# Patient Record
Sex: MALE | Race: WHITE | NOT HISPANIC OR LATINO | URBAN - METROPOLITAN AREA
[De-identification: names, ages, dates, MRNs, and addresses within clinical notes are randomized per-mention and may not be internally consistent; named-entity substitution may affect disease eponyms.]

---

## 2018-08-21 VITALS
HEART RATE: 104 BPM | RESPIRATION RATE: 18 BRPM | SYSTOLIC BLOOD PRESSURE: 144 MMHG | TEMPERATURE: 98 F | OXYGEN SATURATION: 96 % | WEIGHT: 176.15 LBS | DIASTOLIC BLOOD PRESSURE: 85 MMHG

## 2018-08-21 LAB
ALBUMIN SERPL ELPH-MCNC: 3.9 G/DL — SIGNIFICANT CHANGE UP (ref 3.3–5)
ALP SERPL-CCNC: 50 U/L — SIGNIFICANT CHANGE UP (ref 40–120)
ALT FLD-CCNC: 23 U/L — SIGNIFICANT CHANGE UP (ref 10–45)
ANION GAP SERPL CALC-SCNC: 12 MMOL/L — SIGNIFICANT CHANGE UP (ref 5–17)
APTT BLD: 31.8 SEC — SIGNIFICANT CHANGE UP (ref 27.5–37.4)
AST SERPL-CCNC: 26 U/L — SIGNIFICANT CHANGE UP (ref 10–40)
BASOPHILS NFR BLD AUTO: 0.7 % — SIGNIFICANT CHANGE UP (ref 0–2)
BILIRUB SERPL-MCNC: <0.2 MG/DL — SIGNIFICANT CHANGE UP (ref 0.2–1.2)
BUN SERPL-MCNC: 17 MG/DL — SIGNIFICANT CHANGE UP (ref 7–23)
CALCIUM SERPL-MCNC: 9.6 MG/DL — SIGNIFICANT CHANGE UP (ref 8.4–10.5)
CHLORIDE SERPL-SCNC: 102 MMOL/L — SIGNIFICANT CHANGE UP (ref 96–108)
CO2 SERPL-SCNC: 26 MMOL/L — SIGNIFICANT CHANGE UP (ref 22–31)
CREAT SERPL-MCNC: 1.19 MG/DL — SIGNIFICANT CHANGE UP (ref 0.5–1.3)
EOSINOPHIL NFR BLD AUTO: 1.8 % — SIGNIFICANT CHANGE UP (ref 0–6)
ETHANOL SERPL-MCNC: <10 MG/DL — SIGNIFICANT CHANGE UP (ref 0–10)
GLUCOSE SERPL-MCNC: 140 MG/DL — HIGH (ref 70–99)
HCT VFR BLD CALC: 41 % — SIGNIFICANT CHANGE UP (ref 39–50)
HGB BLD-MCNC: 14.3 G/DL — SIGNIFICANT CHANGE UP (ref 13–17)
INR BLD: 1.07 — SIGNIFICANT CHANGE UP (ref 0.88–1.16)
LYMPHOCYTES # BLD AUTO: 27 % — SIGNIFICANT CHANGE UP (ref 13–44)
MCHC RBC-ENTMCNC: 33.5 PG — SIGNIFICANT CHANGE UP (ref 27–34)
MCHC RBC-ENTMCNC: 34.9 G/DL — SIGNIFICANT CHANGE UP (ref 32–36)
MCV RBC AUTO: 96 FL — SIGNIFICANT CHANGE UP (ref 80–100)
MONOCYTES NFR BLD AUTO: 8.1 % — SIGNIFICANT CHANGE UP (ref 2–14)
NEUTROPHILS NFR BLD AUTO: 62.4 % — SIGNIFICANT CHANGE UP (ref 43–77)
PLATELET # BLD AUTO: 254 K/UL — SIGNIFICANT CHANGE UP (ref 150–400)
POTASSIUM SERPL-MCNC: 4.2 MMOL/L — SIGNIFICANT CHANGE UP (ref 3.5–5.3)
POTASSIUM SERPL-SCNC: 4.2 MMOL/L — SIGNIFICANT CHANGE UP (ref 3.5–5.3)
PROT SERPL-MCNC: 6.6 G/DL — SIGNIFICANT CHANGE UP (ref 6–8.3)
PROTHROM AB SERPL-ACNC: 11.9 SEC — SIGNIFICANT CHANGE UP (ref 9.8–12.7)
RBC # BLD: 4.27 M/UL — SIGNIFICANT CHANGE UP (ref 4.2–5.8)
RBC # FLD: 13.3 % — SIGNIFICANT CHANGE UP (ref 10.3–16.9)
SODIUM SERPL-SCNC: 140 MMOL/L — SIGNIFICANT CHANGE UP (ref 135–145)
TROPONIN T SERPL-MCNC: <0.01 NG/ML — SIGNIFICANT CHANGE UP (ref 0–0.01)
WBC # BLD: 10.4 K/UL — SIGNIFICANT CHANGE UP (ref 3.8–10.5)
WBC # FLD AUTO: 10.4 K/UL — SIGNIFICANT CHANGE UP (ref 3.8–10.5)

## 2018-08-21 PROCEDURE — 70496 CT ANGIOGRAPHY HEAD: CPT | Mod: 26

## 2018-08-21 PROCEDURE — 0042T: CPT

## 2018-08-21 PROCEDURE — 70498 CT ANGIOGRAPHY NECK: CPT | Mod: 26

## 2018-08-21 PROCEDURE — 70450 CT HEAD/BRAIN W/O DYE: CPT | Mod: 26,59

## 2018-08-21 RX ORDER — HEPARIN SODIUM 5000 [USP'U]/ML
5000 INJECTION INTRAVENOUS; SUBCUTANEOUS EVERY 8 HOURS
Qty: 0 | Refills: 0 | Status: DISCONTINUED | OUTPATIENT
Start: 2018-08-21 | End: 2018-08-23

## 2018-08-21 RX ORDER — ATORVASTATIN CALCIUM 80 MG/1
40 TABLET, FILM COATED ORAL AT BEDTIME
Qty: 0 | Refills: 0 | Status: DISCONTINUED | OUTPATIENT
Start: 2018-08-21 | End: 2018-08-22

## 2018-08-21 NOTE — ED ADULT NURSE NOTE - OBJECTIVE STATEMENT
63 y/o male with hx of TBI arrived to [facility] ER reporting right arm numbness and weakness since 11am this morning. Pt was evaluated at urgent care center then redirected to ER. Upon assessment, no facial droop noted, patient is speaking full sentences, abdomen soft, lung fields WNL, breathing is equal and unlabored, pulses palpable, face is symmetrical. Pt denies chest pain, dizziness, blurred vision, slurred speech, vomiting, diarrhea, fever, chills, LOC, SOB, fatigue, and palpitations. FSBS assessed. stroke code called. EKG performed. Pt had CT scan performed.

## 2018-08-21 NOTE — CONSULT NOTE ADULT - ASSESSMENT
63 y/o M current smoker hx of traumatic brain injury presenting w/ new onset right arm numbness that first began at 11am on 8/20.     #Rule out stroke  -CT head, CTA, CT perfusion negative  -NIHSS 1 for right arm decreased sensation   -ASA 325mg and will start ASA 81mg from tomorrow   -f/u Lipid panel, Tsh, HgA1c  -PT/OT  -Lipitor 40mg, Asa 81mg  -Dysphagia screening  -Aspiration precautions  -Neuro checks per protocol  -Pending Echocardiogram  -Pending MRI brain

## 2018-08-21 NOTE — CONSULT NOTE ADULT - SUBJECTIVE AND OBJECTIVE BOX
**STROKE CODE CONSULT NOTE**    Last known well time/Time of onset of symptoms:    HPI:    PAST MEDICAL & SURGICAL HISTORY:  Spinal stenosis  TBI (traumatic brain injury)      FAMILY HISTORY:      SOCIAL HISTORY:  Smoking Cesation: Discussed    ROS:  Constitutional: No fever, weight loss or fatigue  Eyes: No eye pain, visual disturbances, or discharge  ENMT:  No difficulty hearing, tinnitus, vertigo; No sinus or throat pain  Neck: No pain or stiffness  Respiratory: No cough, wheezing, chills or hemoptysis  Cardiovascular: No chest pain, palpitations, shortness of breath, dizziness or leg swelling  Gastrointestinal: No abdominal pain. No nausea, vomiting or hematemesis; No diarrhea or constipation. Nohematochezia.  Genitourinary: No dysuria, frequency, hematuria or incontinence  Neurological: As per HPI  Skin: No itching, burning, rashes or lesions   Endocrine: No heat or cold intolerance; No hair loss  Musculoskeletal: No joint pain or swelling; No muscle, back or extremity pain  Psychiatric: No depression, anxiety, mood swings or difficulty sleeping  Heme/Lymph: No easy bruising or bleeding gums    MEDICATIONS  (STANDING):    MEDICATIONS  (PRN):      Allergies    NSAIDs (Unknown)    Intolerances        Vital Signs Last 24 Hrs  T(C): 36.8 (21 Aug 2018 22:01), Max: 36.8 (21 Aug 2018 22:01)  T(F): 98.3 (21 Aug 2018 22:01), Max: 98.3 (21 Aug 2018 22:01)  HR: 91 (21 Aug 2018 22:47) (91 - 104)  BP: 144/79 (21 Aug 2018 22:47) (144/79 - 144/85)  BP(mean): --  RR: 18 (21 Aug 2018 22:47) (18 - 18)  SpO2: 97% (21 Aug 2018 22:47) (96% - 97%)    PHYSICAL EXAM:  Constitutional: WDWN; NAD  Cardiovascular: RRR, no appreciable murmurs; no carotid bruits  Neurologic:  Mental status: Awake, alert and oriented x3.  Recent and remote memory intact.  Naming, repetition and comprehension intact.  Attention/concentration intact.  No dysarthria, no aphasia.  Fund of knowledge appropriate.    Cranial nerves: Pupils equally round and reactive to light, visual fields full, no nystagmus, extraocular muscles intact, V1 through V3 intact bilaterally and symmetric, face symmetric, hearing intact to finger rub, palate elevation symmetric, tongue was midline, sternocleidomastoid/shoulder shrug strength bilaterally 5/5.    Motor:  Normal bulk and tone, strength 5/5 in bilateral upper and lower extremities.   strength 5/5.  Rapid alternating movements intact and symmetric.   Sensation: Intact to light touch, proprioception, and pinprick.  No neglect.   Coordination: No dysmetria on finger-to-nose and heel-to-shin.  No clumsiness.  Reflexes: 2+ in upper and lower extremities, downgoing toes bilaterally  Gait: Narrow and steady. No ataxia.  Romberg negative    NIHSS:    Fingerstick Blood Glucose: CAPILLARY BLOOD GLUCOSE  143 (21 Aug 2018 22:58)      POCT Blood Glucose.: 143 mg/dL (21 Aug 2018 22:40)       LABS:                        14.3   10.4  )-----------( 254      ( 21 Aug 2018 22:26 )             41.0     08-21    140  |  102  |  17  ----------------------------<  140<H>  4.2   |  26  |  1.19    Ca    9.6      21 Aug 2018 22:26    TPro  6.6  /  Alb  3.9  /  TBili  <0.2  /  DBili  x   /  AST  26  /  ALT  23  /  AlkPhos  50  08-21    PT/INR - ( 21 Aug 2018 22:26 )   PT: 11.9 sec;   INR: 1.07          PTT - ( 21 Aug 2018 22:26 )  PTT:31.8 sec  CARDIAC MARKERS ( 21 Aug 2018 22:26 )  x     / <0.01 ng/mL / x     / x     / x              RADIOLOGY & ADDITIONAL STUDIES:    IV-tPA (Y/N):                                   Bolus time:  Reason IV-tPA not given:    ASSESSMENT/PLAN: **STROKE CODE CONSULT NOTE**    Last known well time/Time of onset of symptoms: 8/20/2018     HPI:  61 y/o M current smoker w/ hx of traumatic brain injury greater than 30 years ago on chronic pain medications presenting w/ new onset right arm numbness that first began this morning at 11am upon awakening. Patient reports that he has right headed numbness at baseline secondary to his TBI but the right arm numbness first started today. Given the persistence of his symptoms he went to an urgent care center that advised patient to come to the hospital. Of note patient reports that he was recently given a course of abx--Ciprofloxacin and Flagyl for which he has been taking. Does not recall why he was given abx.     In the ED vital signs T: 98.3,  --> 91, /85, RR 18, Satting 96% on room air. On ROS patient complaining of right arm numbness, denies chest pain, shortness of breath, abdominal pain, nausea, vomiting, fever/chills.       PAST MEDICAL & SURGICAL HISTORY:  Spinal stenosis  TBI (traumatic brain injury)      SOCIAL HISTORY: 50 pack year smoking history.   Smoking Cesation: Discussed    ROS:  Constitutional: No fever, weight loss or fatigue  Eyes: No eye pain, visual disturbances, or discharge  ENMT:  No difficulty hearing, tinnitus, vertigo; No sinus or throat pain  Neck: No pain or stiffness  Respiratory: No cough, wheezing, chills or hemoptysis  Cardiovascular: No chest pain, palpitations, shortness of breath, dizziness or leg swelling  Gastrointestinal: No abdominal pain. No nausea, vomiting or hematemesis; No diarrhea or constipation. Nohematochezia.  Genitourinary: No dysuria, frequency, hematuria or incontinence  Neurological: As per HPI  Skin: No itching, burning, rashes or lesions   Endocrine: No heat or cold intolerance; No hair loss  Musculoskeletal: No joint pain or swelling; No muscle, back or extremity pain  Psychiatric: No depression, anxiety, mood swings or difficulty sleeping  Heme/Lymph: No easy bruising or bleeding gums    MEDICATIONS  (STANDING):    MEDICATIONS  (PRN):      Allergies    NSAIDs (Unknown)    Intolerances        Vital Signs Last 24 Hrs  T(C): 36.8 (21 Aug 2018 22:01), Max: 36.8 (21 Aug 2018 22:01)  T(F): 98.3 (21 Aug 2018 22:01), Max: 98.3 (21 Aug 2018 22:01)  HR: 91 (21 Aug 2018 22:47) (91 - 104)  BP: 144/79 (21 Aug 2018 22:47) (144/79 - 144/85)  BP(mean): --  RR: 18 (21 Aug 2018 22:47) (18 - 18)  SpO2: 97% (21 Aug 2018 22:47) (96% - 97%)    PHYSICAL EXAM:  Constitutional: WDWN; NAD  Cardiovascular: RRR, no appreciable murmurs; no carotid bruits  Neurologic:  Mental status: Awake, alert and oriented x3.  Recent and remote memory intact.  Naming, repetition and comprehension intact.  Attention/concentration intact.  No dysarthria, no aphasia.  Fund of knowledge appropriate.    Cranial nerves: Pupils equally round and reactive to light, visual fields full, no nystagmus, extraocular muscles intact, V1 through V3 intact bilaterally and symmetric, face symmetric, hearing intact to finger rub, palate elevation symmetric, tongue was midline, sternocleidomastoid/shoulder shrug strength bilaterally 5/5.    Motor:  Normal bulk and tone, strength 5/5 in bilateral upper and lower extremities.   strength 5/5.  Rapid alternating movements intact and symmetric.   Sensation: Decreased sensation to light touch on right arm compared to left arm.   Coordination: No dysmetria on finger-to-nose and heel-to-shin.  No clumsiness.  Reflexes: 2+ in upper and lower extremities, downgoing toes bilaterally  Gait: Not assessed     NIHSS: 1    Fingerstick Blood Glucose: CAPILLARY BLOOD GLUCOSE  143 (21 Aug 2018 22:58)      POCT Blood Glucose.: 143 mg/dL (21 Aug 2018 22:40)       LABS:                        14.3   10.4  )-----------( 254      ( 21 Aug 2018 22:26 )             41.0     08-21    140  |  102  |  17  ----------------------------<  140<H>  4.2   |  26  |  1.19    Ca    9.6      21 Aug 2018 22:26    TPro  6.6  /  Alb  3.9  /  TBili  <0.2  /  DBili  x   /  AST  26  /  ALT  23  /  AlkPhos  50  08-21    PT/INR - ( 21 Aug 2018 22:26 )   PT: 11.9 sec;   INR: 1.07          PTT - ( 21 Aug 2018 22:26 )  PTT:31.8 sec  CARDIAC MARKERS ( 21 Aug 2018 22:26 )  x     / <0.01 ng/mL / x     / x     / x            RADIOLOGY & ADDITIONAL STUDIES: Reviewed.     IV-tPA (Y/N):      No                             Bolus time:  Reason IV-tPA not given: Not indicated.

## 2018-08-22 ENCOUNTER — INPATIENT (INPATIENT)
Facility: HOSPITAL | Age: 63
LOS: 0 days | Discharge: HOME | DRG: 552 | End: 2018-08-23
Attending: PSYCHIATRY & NEUROLOGY | Admitting: PSYCHIATRY & NEUROLOGY
Payer: MEDICARE

## 2018-08-22 DIAGNOSIS — R63.8 OTHER SYMPTOMS AND SIGNS CONCERNING FOOD AND FLUID INTAKE: ICD-10-CM

## 2018-08-22 DIAGNOSIS — Z91.89 OTHER SPECIFIED PERSONAL RISK FACTORS, NOT ELSEWHERE CLASSIFIED: ICD-10-CM

## 2018-08-22 DIAGNOSIS — M48.00 SPINAL STENOSIS, SITE UNSPECIFIED: ICD-10-CM

## 2018-08-22 DIAGNOSIS — I63.9 CEREBRAL INFARCTION, UNSPECIFIED: ICD-10-CM

## 2018-08-22 DIAGNOSIS — F32.9 MAJOR DEPRESSIVE DISORDER, SINGLE EPISODE, UNSPECIFIED: ICD-10-CM

## 2018-08-22 DIAGNOSIS — Z29.9 ENCOUNTER FOR PROPHYLACTIC MEASURES, UNSPECIFIED: ICD-10-CM

## 2018-08-22 DIAGNOSIS — Z72.0 TOBACCO USE: ICD-10-CM

## 2018-08-22 DIAGNOSIS — S06.9X9A UNSPECIFIED INTRACRANIAL INJURY WITH LOSS OF CONSCIOUSNESS OF UNSPECIFIED DURATION, INITIAL ENCOUNTER: ICD-10-CM

## 2018-08-22 LAB
APPEARANCE UR: CLEAR — SIGNIFICANT CHANGE UP
BILIRUB UR-MCNC: NEGATIVE — SIGNIFICANT CHANGE UP
CHOLEST SERPL-MCNC: 219 MG/DL — HIGH (ref 10–199)
COLOR SPEC: YELLOW — SIGNIFICANT CHANGE UP
DIFF PNL FLD: NEGATIVE — SIGNIFICANT CHANGE UP
GLUCOSE UR QL: NEGATIVE — SIGNIFICANT CHANGE UP
HBA1C BLD-MCNC: 5.8 % — HIGH (ref 4–5.6)
HDLC SERPL-MCNC: 42 MG/DL — SIGNIFICANT CHANGE UP
KETONES UR-MCNC: NEGATIVE — SIGNIFICANT CHANGE UP
LEUKOCYTE ESTERASE UR-ACNC: NEGATIVE — SIGNIFICANT CHANGE UP
LIPID PNL WITH DIRECT LDL SERPL: 138 MG/DL — HIGH
NITRITE UR-MCNC: NEGATIVE — SIGNIFICANT CHANGE UP
PCP SPEC-MCNC: SIGNIFICANT CHANGE UP
PH UR: 8.5 — HIGH (ref 5–8)
PROT UR-MCNC: NEGATIVE MG/DL — SIGNIFICANT CHANGE UP
SP GR SPEC: <=1.005 — SIGNIFICANT CHANGE UP (ref 1–1.03)
TOTAL CHOLESTEROL/HDL RATIO MEASUREMENT: 5.2 RATIO — SIGNIFICANT CHANGE UP (ref 3.4–9.6)
TRIGL SERPL-MCNC: 197 MG/DL — HIGH (ref 10–149)
TSH SERPL-MCNC: 0.79 UIU/ML — SIGNIFICANT CHANGE UP (ref 0.35–4.94)
UROBILINOGEN FLD QL: 0.2 E.U./DL — SIGNIFICANT CHANGE UP

## 2018-08-22 PROCEDURE — 99223 1ST HOSP IP/OBS HIGH 75: CPT

## 2018-08-22 PROCEDURE — 70551 MRI BRAIN STEM W/O DYE: CPT | Mod: 26

## 2018-08-22 PROCEDURE — 72141 MRI NECK SPINE W/O DYE: CPT | Mod: 26

## 2018-08-22 PROCEDURE — 93010 ELECTROCARDIOGRAM REPORT: CPT

## 2018-08-22 PROCEDURE — 99285 EMERGENCY DEPT VISIT HI MDM: CPT | Mod: 25

## 2018-08-22 RX ORDER — CYCLOBENZAPRINE HYDROCHLORIDE 10 MG/1
5 TABLET, FILM COATED ORAL THREE TIMES A DAY
Qty: 0 | Refills: 0 | Status: DISCONTINUED | OUTPATIENT
Start: 2018-08-22 | End: 2018-08-23

## 2018-08-22 RX ORDER — ASPIRIN/CALCIUM CARB/MAGNESIUM 324 MG
325 TABLET ORAL ONCE
Qty: 0 | Refills: 0 | Status: COMPLETED | OUTPATIENT
Start: 2018-08-22 | End: 2018-08-22

## 2018-08-22 RX ORDER — ATORVASTATIN CALCIUM 80 MG/1
80 TABLET, FILM COATED ORAL AT BEDTIME
Qty: 0 | Refills: 0 | Status: DISCONTINUED | OUTPATIENT
Start: 2018-08-22 | End: 2018-08-23

## 2018-08-22 RX ORDER — DEXTROAMPHETAMINE SACCHARATE, AMPHETAMINE ASPARTATE, DEXTROAMPHETAMINE SULFATE AND AMPHETAMINE SULFATE 1.875; 1.875; 1.875; 1.875 MG/1; MG/1; MG/1; MG/1
20 TABLET ORAL
Qty: 0 | Refills: 0 | Status: DISCONTINUED | OUTPATIENT
Start: 2018-08-22 | End: 2018-08-23

## 2018-08-22 RX ORDER — OXYCODONE AND ACETAMINOPHEN 5; 325 MG/1; MG/1
1 TABLET ORAL EVERY 6 HOURS
Qty: 0 | Refills: 0 | Status: DISCONTINUED | OUTPATIENT
Start: 2018-08-22 | End: 2018-08-23

## 2018-08-22 RX ORDER — RISPERIDONE 4 MG/1
0.5 TABLET ORAL EVERY 6 HOURS
Qty: 0 | Refills: 0 | Status: DISCONTINUED | OUTPATIENT
Start: 2018-08-22 | End: 2018-08-23

## 2018-08-22 RX ORDER — VENLAFAXINE HCL 75 MG
75 CAPSULE, EXT RELEASE 24 HR ORAL EVERY 8 HOURS
Qty: 0 | Refills: 0 | Status: DISCONTINUED | OUTPATIENT
Start: 2018-08-22 | End: 2018-08-23

## 2018-08-22 RX ORDER — NICOTINE POLACRILEX 2 MG
1 GUM BUCCAL DAILY
Qty: 0 | Refills: 0 | Status: DISCONTINUED | OUTPATIENT
Start: 2018-08-22 | End: 2018-08-23

## 2018-08-22 RX ORDER — ASPIRIN/CALCIUM CARB/MAGNESIUM 324 MG
81 TABLET ORAL DAILY
Qty: 0 | Refills: 0 | Status: DISCONTINUED | OUTPATIENT
Start: 2018-08-23 | End: 2018-08-23

## 2018-08-22 RX ORDER — DEXTROAMPHETAMINE SACCHARATE, AMPHETAMINE ASPARTATE, DEXTROAMPHETAMINE SULFATE AND AMPHETAMINE SULFATE 1.875; 1.875; 1.875; 1.875 MG/1; MG/1; MG/1; MG/1
20 TABLET ORAL EVERY 8 HOURS
Qty: 0 | Refills: 0 | Status: DISCONTINUED | OUTPATIENT
Start: 2018-08-22 | End: 2018-08-22

## 2018-08-22 RX ADMIN — Medication 75 MILLIGRAM(S): at 17:22

## 2018-08-22 RX ADMIN — Medication 1 PATCH: at 08:00

## 2018-08-22 RX ADMIN — RISPERIDONE 0.5 MILLIGRAM(S): 4 TABLET ORAL at 10:50

## 2018-08-22 RX ADMIN — Medication 200 MILLIGRAM(S): at 07:22

## 2018-08-22 RX ADMIN — DEXTROAMPHETAMINE SACCHARATE, AMPHETAMINE ASPARTATE, DEXTROAMPHETAMINE SULFATE AND AMPHETAMINE SULFATE 20 MILLIGRAM(S): 1.875; 1.875; 1.875; 1.875 TABLET ORAL at 12:25

## 2018-08-22 RX ADMIN — ATORVASTATIN CALCIUM 80 MILLIGRAM(S): 80 TABLET, FILM COATED ORAL at 21:34

## 2018-08-22 RX ADMIN — OXYCODONE AND ACETAMINOPHEN 1 TABLET(S): 5; 325 TABLET ORAL at 17:25

## 2018-08-22 RX ADMIN — HEPARIN SODIUM 5000 UNIT(S): 5000 INJECTION INTRAVENOUS; SUBCUTANEOUS at 00:04

## 2018-08-22 RX ADMIN — RISPERIDONE 0.5 MILLIGRAM(S): 4 TABLET ORAL at 17:22

## 2018-08-22 RX ADMIN — HEPARIN SODIUM 5000 UNIT(S): 5000 INJECTION INTRAVENOUS; SUBCUTANEOUS at 14:05

## 2018-08-22 RX ADMIN — HEPARIN SODIUM 5000 UNIT(S): 5000 INJECTION INTRAVENOUS; SUBCUTANEOUS at 21:34

## 2018-08-22 RX ADMIN — Medication 325 MILLIGRAM(S): at 04:13

## 2018-08-22 RX ADMIN — HEPARIN SODIUM 5000 UNIT(S): 5000 INJECTION INTRAVENOUS; SUBCUTANEOUS at 07:22

## 2018-08-22 RX ADMIN — RISPERIDONE 0.5 MILLIGRAM(S): 4 TABLET ORAL at 23:51

## 2018-08-22 RX ADMIN — ATORVASTATIN CALCIUM 40 MILLIGRAM(S): 80 TABLET, FILM COATED ORAL at 00:04

## 2018-08-22 RX ADMIN — Medication 75 MILLIGRAM(S): at 10:50

## 2018-08-22 RX ADMIN — OXYCODONE AND ACETAMINOPHEN 1 TABLET(S): 5; 325 TABLET ORAL at 10:53

## 2018-08-22 RX ADMIN — OXYCODONE AND ACETAMINOPHEN 1 TABLET(S): 5; 325 TABLET ORAL at 11:50

## 2018-08-22 RX ADMIN — Medication 200 MILLIGRAM(S): at 14:05

## 2018-08-22 RX ADMIN — DEXTROAMPHETAMINE SACCHARATE, AMPHETAMINE ASPARTATE, DEXTROAMPHETAMINE SULFATE AND AMPHETAMINE SULFATE 20 MILLIGRAM(S): 1.875; 1.875; 1.875; 1.875 TABLET ORAL at 17:22

## 2018-08-22 RX ADMIN — Medication 200 MILLIGRAM(S): at 21:34

## 2018-08-22 RX ADMIN — OXYCODONE AND ACETAMINOPHEN 1 TABLET(S): 5; 325 TABLET ORAL at 18:00

## 2018-08-22 NOTE — OCCUPATIONAL THERAPY INITIAL EVALUATION ADULT - MD ORDER
Per chart, 63 y/o M current smoker w/ hx of traumatic brain injury greater than 30 years ago on chronic pain medications presenting w/ new onset right arm numbness that first began this morning at 11am upon awakening. Patient reports that he has right headed numbness at baseline secondary to his TBI but the right arm numbness first started today. Given the persistence of his symptoms he went to an urgent care center that advised patient to come to the hospital.

## 2018-08-22 NOTE — PROGRESS NOTE ADULT - ASSESSMENT
63 y/o M current smoker hx of traumatic brain injury presenting w/ new onset right arm numbness currently being worked up for a stroke.

## 2018-08-22 NOTE — H&P ADULT - PROBLEM SELECTOR PLAN 7
1) PCP Contacted on Admission: (Y/N) --> Name & Phone #:  2) Date of Contact with PCP:  3) PCP Contacted at Discharge: (Y/N, N/A)  4) Summary of Handoff Given to PCP:   5) Post-Discharge Appointment Date and Location: PPX: HS[facility]   FULL CODE  DISPO: [id number] for rule out stroke

## 2018-08-22 NOTE — H&P ADULT - PROBLEM SELECTOR PLAN 1
Patient w/ NIHSS of 1 on admission for decreased sensation in right arm. CT head, CTA and CT perfusion negative.   -f/u Lipid panel, Tsh, HgA1c  -PT/OT  -Lipitor 40mg   -S/p ASA 325mg. Will start ASA 81mg daily afterwards   -Passed bedside dysphagia   -Aspiration precautions  -Neuro checks per protocol  -Pending Echocardiogram with bubble   -Pending MRI brain

## 2018-08-22 NOTE — H&P ADULT - ATTENDING COMMENTS
Patient seen and examined with Resident.  Agree with above.  See Stroke consult note for more information.

## 2018-08-22 NOTE — H&P ADULT - PROBLEM SELECTOR PLAN 2
Hx of spinal stenosis.   -c/w effexor 75mg q8hrs   -c/w vicodin 7.5/325mg q6hrs   -c/w lyrica 200mg q8hrs   -c/w soma 350mg BID Hx of spinal stenosis.   -Patient takes Soma 350mg BID at home--not on formulary. Will give Flexeril 5mg TID and increase as pt needs  -Patient takes Vicodin 7.5/325mg q6hrs at home. Will give Percocet 5/325 q6hrs PRN   -c/w lyrica 200mg q8hrs

## 2018-08-22 NOTE — H&P ADULT - ASSESSMENT
63 y/o M current smoker hx of traumatic brain injury presenting w/ new onset right arm numbness that first began at 11am on 8/20.

## 2018-08-22 NOTE — OCCUPATIONAL THERAPY INITIAL EVALUATION ADULT - GROSSLY INTACT, SENSORY
Patient reports sensation intact to light touch BUE/BLE, states he has chronic right facial numbness since TBI. Reports intermittent numbness/tingling of RUE, states it has improved since admission.

## 2018-08-22 NOTE — H&P ADULT - PROBLEM SELECTOR PLAN 4
Patient w/ 50pack year history of tobacco use  -discussed with patient about tobacco cessation Patient w/ 50pack year history of tobacco use  -discussed with patient about tobacco cessation and he is currently not interested  -21mcg nicotine patch

## 2018-08-22 NOTE — OCCUPATIONAL THERAPY INITIAL EVALUATION ADULT - GENERAL OBSERVATIONS, REHAB EVAL
Left hand dominant. Chart reviewed, patient cleared for OT eval by medical resident despite bedrest order. Received semi-supine, NAD, +tele, +heplock, denies pain.

## 2018-08-22 NOTE — PROGRESS NOTE ADULT - SUBJECTIVE AND OBJECTIVE BOX
INTERVAL HPI/OVERNIGHT EVENTS:  Patient was seen and examined at bedside. Pt states that his right arm has less sensation but no longer feels weak. Otherwise, denies any blurriness, dizziness, chest pain, fever, SOB.     VITAL SIGNS:  T(F): 97.9 (08-22-18 @ 14:09)  HR: 92 (08-22-18 @ 13:56)  BP: 135/71 (08-22-18 @ 13:56)  RR: 17 (08-22-18 @ 13:56)  SpO2: 98% (08-22-18 @ 13:56)  Wt(kg): --    PHYSICAL EXAM:    Constitutional: female/male, WDWN, NAD  HEENT: PERRL, EOMI, sclera non-icteric, neck supple, trachea midline, no masses, no JVD, MMM, good dentition  Respiratory: CTA b/l, good air entry b/l, no wheezing, no rhonchi, no rales, without accessory muscle use and no intercostal retractions  Cardiovascular: RRR, normal S1S2, no M/R/G  Gastrointestinal: soft, NTND, no masses palpable, BS normal  Extremities: Warm, well perfused, pulses equal bilateral upper and lower extremities, no edema, no clubbing  Neurological: AAOx3, CN Grossly intact  Skin: Normal temperature, warm, dry    MEDICATIONS  (STANDING):  amphetamine/dextroamphetamine 20 milliGRAM(s) Oral <User Schedule>  atorvastatin 40 milliGRAM(s) Oral at bedtime  heparin  Injectable 5000 Unit(s) SubCutaneous every 8 hours  nicotine - 21 mG/24Hr(s) Patch 1 patch Transdermal daily  pregabalin 200 milliGRAM(s) Oral three times a day  risperiDONE   Tablet 0.5 milliGRAM(s) Oral every 6 hours  venlafaxine 75 milliGRAM(s) Oral every 8 hours    MEDICATIONS  (PRN):  cyclobenzaprine 5 milliGRAM(s) Oral three times a day PRN Muscle Spasm  oxyCODONE    5 mG/acetaminophen 325 mG 1 Tablet(s) Oral every 6 hours PRN Severe Pain (7 - 10)      Allergies    NSAIDs (Unknown)    Intolerances        LABS:                        14.3   10.4  )-----------( 254      ( 21 Aug 2018 22:26 )             41.0     08-21    140  |  102  |  17  ----------------------------<  140<H>  4.2   |  26  |  1.19    Ca    9.6      21 Aug 2018 22:26    TPro  6.6  /  Alb  3.9  /  TBili  <0.2  /  DBili  x   /  AST  26  /  ALT  23  /  AlkPhos  50  08-21    PT/INR - ( 21 Aug 2018 22:26 )   PT: 11.9 sec;   INR: 1.07          PTT - ( 21 Aug 2018 22:26 )  PTT:31.8 sec  Urinalysis Basic - ( 22 Aug 2018 00:50 )    Color: Yellow / Appearance: Clear / SG: <=1.005 / pH: x  Gluc: x / Ketone: NEGATIVE  / Bili: Negative / Urobili: 0.2 E.U./dL   Blood: x / Protein: NEGATIVE mg/dL / Nitrite: NEGATIVE   Leuk Esterase: NEGATIVE / RBC: x / WBC x   Sq Epi: x / Non Sq Epi: x / Bacteria: x        RADIOLOGY & ADDITIONAL TESTS:  Reviewed INTERVAL HPI/OVERNIGHT EVENTS:  Patient was seen and examined at bedside. Pt states that his right arm has less sensation but no longer feels weak. Otherwise, denies any blurriness, dizziness, chest pain, fever, SOB.     VITAL SIGNS:  T(F): 97.9 (08-22-18 @ 14:09)  HR: 92 (08-22-18 @ 13:56)  BP: 135/71 (08-22-18 @ 13:56)  RR: 17 (08-22-18 @ 13:56)  SpO2: 98% (08-22-18 @ 13:56)  Wt(kg): --    PHYSICAL EXAM:    Constitutional: female/male, WDWN, NAD  HEENT: PERRL, EOMI, sclera non-icteric, neck supple, trachea midline, no masses, no JVD, MMM, good dentition  Respiratory: CTA b/l, good air entry b/l, no wheezing, no rhonchi, no rales, without accessory muscle use and no intercostal retractions  Cardiovascular: RRR, normal S1S2, no M/R/G  Gastrointestinal: soft, NTND, no masses palpable, BS normal  Extremities: Warm, well perfused, pulses equal bilateral upper and lower extremities, no edema, no clubbing  Neurological: AAOx3, CN Grossly intact    MEDICATIONS  (STANDING):  amphetamine/dextroamphetamine 20 milliGRAM(s) Oral <User Schedule>  atorvastatin 40 milliGRAM(s) Oral at bedtime  heparin  Injectable 5000 Unit(s) SubCutaneous every 8 hours  nicotine - 21 mG/24Hr(s) Patch 1 patch Transdermal daily  pregabalin 200 milliGRAM(s) Oral three times a day  risperiDONE   Tablet 0.5 milliGRAM(s) Oral every 6 hours  venlafaxine 75 milliGRAM(s) Oral every 8 hours    MEDICATIONS  (PRN):  cyclobenzaprine 5 milliGRAM(s) Oral three times a day PRN Muscle Spasm  oxyCODONE    5 mG/acetaminophen 325 mG 1 Tablet(s) Oral every 6 hours PRN Severe Pain (7 - 10)      Allergies    NSAIDs (Unknown)    Intolerances        LABS:                        14.3   10.4  )-----------( 254      ( 21 Aug 2018 22:26 )             41.0     08-21    140  |  102  |  17  ----------------------------<  140<H>  4.2   |  26  |  1.19    Ca    9.6      21 Aug 2018 22:26    TPro  6.6  /  Alb  3.9  /  TBili  <0.2  /  DBili  x   /  AST  26  /  ALT  23  /  AlkPhos  50  08-21    PT/INR - ( 21 Aug 2018 22:26 )   PT: 11.9 sec;   INR: 1.07          PTT - ( 21 Aug 2018 22:26 )  PTT:31.8 sec  Urinalysis Basic - ( 22 Aug 2018 00:50 )    Color: Yellow / Appearance: Clear / SG: <=1.005 / pH: x  Gluc: x / Ketone: NEGATIVE  / Bili: Negative / Urobili: 0.2 E.U./dL   Blood: x / Protein: NEGATIVE mg/dL / Nitrite: NEGATIVE   Leuk Esterase: NEGATIVE / RBC: x / WBC x   Sq Epi: x / Non Sq Epi: x / Bacteria: x        RADIOLOGY & ADDITIONAL TESTS:  Reviewed INTERVAL HPI/OVERNIGHT EVENTS:  Patient was seen and examined at bedside. Pt states that his right arm has less sensation but no longer feels weak. Otherwise, denies any blurriness, dizziness, chest pain, fever, SOB.     VITAL SIGNS:  T(F): 97.9 (08-22-18 @ 14:09)  HR: 92 (08-22-18 @ 13:56)  BP: 135/71 (08-22-18 @ 13:56)  RR: 17 (08-22-18 @ 13:56)  SpO2: 98% (08-22-18 @ 13:56)  Wt(kg): --    PHYSICAL EXAM:    General:  male, NAD  HEENT: PERRLA, EOMI, NCAT  Respiratory: CTAB  Cardiovascular: RRR, normal S1S2, no M/R/G  Gastrointestinal: soft, NTND, no masses palpable, BS normal  Extremities: Warm, well perfused, pulses equal bilateral upper and lower extremities, no edema, no clubbing  Skin: Normal temperature, warm, dry    Neurologic:  	Mental status: alert, awake, oriented x3.  slight dysarthria,   	CN: V1-V3 with decreased sensation, PERRLA, no nystagmus, no facial droop, uvula midline  	Motor: 4/5 Right upper and lower extremity, 5/5 Left upper and lower extremities     Reflexes: 2+ brachioradialis, patella    Sensation: Responds to noxious stimuli, decreased sensation on V1 - V3     Cerebellum: No dysmetria on finger to nose testing    MEDICATIONS  (STANDING):  amphetamine/dextroamphetamine 20 milliGRAM(s) Oral <User Schedule>  atorvastatin 40 milliGRAM(s) Oral at bedtime  heparin  Injectable 5000 Unit(s) SubCutaneous every 8 hours  nicotine - 21 mG/24Hr(s) Patch 1 patch Transdermal daily  pregabalin 200 milliGRAM(s) Oral three times a day  risperiDONE   Tablet 0.5 milliGRAM(s) Oral every 6 hours  venlafaxine 75 milliGRAM(s) Oral every 8 hours    MEDICATIONS  (PRN):  cyclobenzaprine 5 milliGRAM(s) Oral three times a day PRN Muscle Spasm  oxyCODONE    5 mG/acetaminophen 325 mG 1 Tablet(s) Oral every 6 hours PRN Severe Pain (7 - 10)      Allergies    NSAIDs (Unknown)    Intolerances        LABS:                        14.3   10.4  )-----------( 254      ( 21 Aug 2018 22:26 )             41.0     08-21    140  |  102  |  17  ----------------------------<  140<H>  4.2   |  26  |  1.19    Ca    9.6      21 Aug 2018 22:26    TPro  6.6  /  Alb  3.9  /  TBili  <0.2  /  DBili  x   /  AST  26  /  ALT  23  /  AlkPhos  50  08-21    PT/INR - ( 21 Aug 2018 22:26 )   PT: 11.9 sec;   INR: 1.07          PTT - ( 21 Aug 2018 22:26 )  PTT:31.8 sec  Urinalysis Basic - ( 22 Aug 2018 00:50 )    Color: Yellow / Appearance: Clear / SG: <=1.005 / pH: x  Gluc: x / Ketone: NEGATIVE  / Bili: Negative / Urobili: 0.2 E.U./dL   Blood: x / Protein: NEGATIVE mg/dL / Nitrite: NEGATIVE   Leuk Esterase: NEGATIVE / RBC: x / WBC x   Sq Epi: x / Non Sq Epi: x / Bacteria: x        RADIOLOGY & ADDITIONAL TESTS:  Reviewed

## 2018-08-22 NOTE — PROGRESS NOTE ADULT - PROBLEM SELECTOR PLAN 4
Patient w/ 50pack year history of tobacco use  -discussed with patient about tobacco cessation and he is currently not interested  -21mcg nicotine patch Patient w/ 50pack year history of tobacco use  -currently not interested  -21mcg nicotine patch if needed

## 2018-08-22 NOTE — PROGRESS NOTE ADULT - PROBLEM SELECTOR PLAN 2
Hx of spinal stenosis.   -c/w pregabalin 200mg TID po   -  -Patient takes Vicodin 7.5/325mg q6hrs at home. Will give Percocet 5/325 q6hrs PRN   -c/w lyrica 200mg q8hrs Hx of spinal stenosis.   -c/w pregabalin 200mg TID po, flexiril 5mg TID  -Patient takes Vicodin 7.5/325mg q6hrs at home. Substitute with Percocet 5/325 q6hrs PRN

## 2018-08-22 NOTE — H&P ADULT - NSHPPHYSICALEXAM_GEN_ALL_CORE
.  VITAL SIGNS:  T(C): 36.8 (08-22-18 @ 00:39), Max: 36.8 (08-21-18 @ 22:01)  T(F): 98.2 (08-22-18 @ 00:39), Max: 98.3 (08-21-18 @ 22:01)  HR: 94 (08-22-18 @ 00:39) (91 - 104)  BP: 141/82 (08-22-18 @ 00:39) (141/82 - 144/85)  BP(mean): --  RR: 18 (08-22-18 @ 00:39) (18 - 18)  SpO2: 97% (08-22-18 @ 00:39) (96% - 97%)  Wt(kg): --    PHYSICAL EXAM:    Constitutional: WDWN resting comfortably in bed; NAD  Head: NC/AT  Eyes: PERRL, EOMI, anicteric sclera  ENT: no nasal discharge; uvula midline, no oropharyngeal erythema or exudates; MMM  Neck: supple; no JVD or thyromegaly  Respiratory: CTA B/L; no W/R/R, no retractions  Cardiac: +S1/S2; RRR; no M/R/G; PMI non-displaced  Gastrointestinal: abdomen soft, NT/ND; no rebound or guarding; +BSx4  Genitourinary: normal external genitalia  Back: spine midline, no bony tenderness or step-offs; no CVAT B/L  Extremities: WWP, no clubbing or cyanosis; no peripheral edema  Musculoskeletal: NROM x4; no joint swelling, tenderness or erythema  Vascular: 2+ radial, femoral, DP/PT pulses B/L  Dermatologic: skin warm, dry and intact; no rashes, wounds, or scars  Lymphatic: no submandibular or cervical LAD  Neurologic: AAOx3; CNII-XII grossly intact; no focal deficits  Psychiatric: affect and characteristics of appearance, verbalizations, behaviors are appropriate

## 2018-08-22 NOTE — PHYSICAL THERAPY INITIAL EVALUATION ADULT - ADDITIONAL COMMENTS
Pt. reports living in an elevator building, no steps to enter, denies prior use of assistive device.

## 2018-08-22 NOTE — H&P ADULT - HISTORY OF PRESENT ILLNESS
63 y/o M current smoker w/ hx of traumatic brain injury greater than 30 years ago on chronic pain medications presenting w/ new onset right arm numbness that first began this morning at 11am upon awakening. Patient reports that he has right headed numbness at baseline secondary to his TBI but the right arm numbness first started today. Given the persistence of his symptoms he went to an urgent care center that advised patient to come to the hospital. Of note patient reports that he was recently given a course of abx--Ciprofloxacin and Flagyl for which he has been taking. Does not recall why he was given abx.     In the ED vital signs T: 98.3,  --> 91, /85, RR 18, Satting 96% on room air. On ROS patient complaining of right arm numbness, denies chest pain, shortness of breath, abdominal pain, nausea, vomiting, fever/chills.

## 2018-08-22 NOTE — PHYSICAL THERAPY INITIAL EVALUATION ADULT - PERTINENT HX OF CURRENT PROBLEM, REHAB EVAL
Pt. is a 62 y.o. male admitted with new onset RUE numbness, + h/o prior TBI 30 years prior with residual R facial numbness. Initial head CT negative for acute infarct.

## 2018-08-22 NOTE — CONSULT NOTE ADULT - SUBJECTIVE AND OBJECTIVE BOX
YESICA SUAREZ    [id number]    Patient is a 62y old  Male who presents with a chief complaint of RUE numbness (22 Aug 2018 02:00)      HPI:  63 y/o M current smoker w/ hx of traumatic brain injury greater than 30 years ago on chronic pain medications presenting w/ new onset right arm numbness that first began this morning at 11am upon awakening. Patient reports that he has right headed numbness at baseline secondary to his TBI but the right arm numbness first started today. Given the persistence of his symptoms he went to an urgent care center that advised patient to come to the hospital. Of note patient reports that he was recently given a course of abx--Ciprofloxacin and Flagyl for which he has been taking. Does not recall why he was given abx.     In the ED vital signs T: 98.3,  --> 91, /85, RR 18, Satting 96% on room air. On ROS patient complaining of right arm numbness, denies chest pain, shortness of breath, abdominal pain, nausea, vomiting, fever/chills. (22 Aug 2018 00:54)      PAST MEDICAL & SURGICAL HISTORY:  Spinal stenosis  TBI (traumatic brain injury)        Social History:    FAMILY HISTORY:      Functional Level Prior to Admission:                          14.3   10.4  )-----------( 254      ( 21 Aug 2018 22:26 )             41.0       08-21    140  |  102  |  17  ----------------------------<  140<H>  4.2   |  26  |  1.19    Ca    9.6      21 Aug 2018 22:26    TPro  6.6  /  Alb  3.9  /  TBili  <0.2  /  DBili  x   /  AST  26  /  ALT  23  /  AlkPhos  50  08-21      Vital Signs Last 24 Hrs  T(C): 36.7 (22 Aug 2018 06:13), Max: 36.8 (21 Aug 2018 22:01)  T(F): 98.1 (22 Aug 2018 06:13), Max: 98.3 (21 Aug 2018 22:01)  HR: 86 (22 Aug 2018 05:20) (84 - 104)  BP: 161/76 (22 Aug 2018 05:20) (141/82 - 161/76)  BP(mean): 109 (22 Aug 2018 05:20) (108 - 109)  RR: 16 (22 Aug 2018 05:20) (16 - 18)  SpO2: 99% (22 Aug 2018 05:20) (96% - 100%)      PHYSICAL EXAM:  This 62 y-o male was admitted on 08/22/18 with numbness and weakness of Rt. arm.  h/o TBI more than 30 years ago.  Lives alone, ambulation with cane.  08/21/18 CT brain/ angio neck and head; unremarkable.      Constitutional: comfortable in bed.  Alert and stable and in good spirits.  Feels better.    Eyes:  neg.    ENMT:  neg.    Neck:  supple, no neck pain    Breasts:    Back:  no back pain    Respiratory:  no SOB    Cardiovascular:  no chest pain, no palpitation    Gastrointestinal:  no abdominal pain    Genitourinary:    Rectal:    Extremities:  full ROM 4 ext., no joint pain, no edema    Vascular: no clubbing, no cyanosis    Neurological: alert, oriented and cooperative.  c/o numbness Rt.  arm, mildly diminished touch and pain sensation, position sense intact.  Muscle strength; Rt.  shoulder 4+-5/5, elbow and hand 5/5, Lt. arm 4+/5 shoulder, otherwise 5/5, both lower ext; 5/5. Able to strand and ambulation without device needs contact guarding, pt. feels poor balance.    Skin:    Lymph Nodes:    Musculoskeletal:    Psychiatric:            Impression:  s/p TBI, more than 30 years  ago with  new onset of numbness of Rt . arm    Recommendations:  PT / OT for evaluation  for safety ADL and gait

## 2018-08-22 NOTE — H&P ADULT - PROBLEM SELECTOR PLAN 3
Hx of TBI greater than 30 years ago w/ residual right sided head numbness.   -c/w pain regimen as above  -c/w adderall 10mg q4 hrs Hx of TBI greater than 30 years ago w/ residual right sided head numbness.   -c/w pain regimen as above  -c/w adderall 20mg TID (Patient is prescribed 10mg 6times a day as per his pharmacy but he reports he usually takes 20mg TID or 30mg BID)

## 2018-08-22 NOTE — H&P ADULT - NSHPLABSRESULTS_GEN_ALL_CORE
.  LABS:                         14.3   10.4  )-----------( 254      ( 21 Aug 2018 22:26 )             41.0     08-21    140  |  102  |  17  ----------------------------<  140<H>  4.2   |  26  |  1.19    Ca    9.6      21 Aug 2018 22:26    TPro  6.6  /  Alb  3.9  /  TBili  <0.2  /  DBili  x   /  AST  26  /  ALT  23  /  AlkPhos  50  08-21    PT/INR - ( 21 Aug 2018 22:26 )   PT: 11.9 sec;   INR: 1.07          PTT - ( 21 Aug 2018 22:26 )  PTT:31.8 sec    CARDIAC MARKERS ( 21 Aug 2018 22:26 )  x     / <0.01 ng/mL / x     / x     / x                RADIOLOGY, EKG & ADDITIONAL TESTS: Reviewed.

## 2018-08-22 NOTE — ED PROVIDER NOTE - OBJECTIVE STATEMENT
62M hx of TBI reports numbness to R side of arm, weakness to R side of arm, 62M hx of TBI reports numbness to R side of arm, weakness to R side of arm. Pt upgraded to me concern for stroke.

## 2018-08-22 NOTE — ED PROVIDER NOTE - MEDICAL DECISION MAKING DETAILS
62M with R sided weakness started at 11 am outside of stroke window, stroke code called upon arrival, discussed case with amisha, plan for cta/ct/ct p. will admit to [facility] for further w/u. Pt is out of window for tpa or other intervention.

## 2018-08-22 NOTE — PROGRESS NOTE ADULT - PROBLEM SELECTOR PLAN 3
Hx of TBI greater than 30 years ago w/ residual right sided head numbness.   -c/w pain regimen as above  -c/w adderall 20mg TID (Patient is prescribed 10mg 6times a day as per his pharmacy but he reports he usually takes 20mg TID or 30mg BID)

## 2018-08-22 NOTE — ED PROVIDER NOTE - PHYSICAL EXAMINATION
General: NAD, alert, conversant, comfortable-appearing  Head: ncat  Eyes: clear, pupils round  Thoat: MMM, oropharynx clear  Neck: supple, no large masses, no meningismus  CV: RRR no murmurs  Resp: CTAB no w/c/r  Abd: nontender, no rebound/guarding  Back: no c-s ttp  Ext: no lower ext swelling, 2+ dp/pt pulses sensation intact to light touch l4/l5/s1  Neuro: alert and oriented x 3, mild facial droop strength 4/5 in r ue compared to l,sensation decreased on r side compared to l f/a/l, gait steady, fnf/hts intact bl, finger taps/foot taps intact bl.

## 2018-08-22 NOTE — PROGRESS NOTE ADULT - PROBLEM SELECTOR PLAN 1
"Pt here for session 2.     BG readings as follows:   [id number]  Prior to [id number]  2 hours post [id number]  Overall average is 133    Blood pressure 116/70, pulse 96, height 1.588 m (5' 2.5\"), weight 77.656 kg (171 lb 3.2 oz), SpO2 97 %.  Weight is down 5# from initial visit.     Current diabetes medications: none.     Readiness to learn: willing.     Barriers to learning: none.      Pt reports she is not counting carbohydrates but has cut back on intake of sweets and has cut back on portion sizes.  She has started walking 100 steps per day on her treadmill but is cautious as she has had back problems in the past.  She also goes to [employer] 1-2x/week and does some walking.      Pt actively participated and demonstrated adequate understanding of topics discussed.     Topics covered: evaluating BG self-test results & improving self-testing skills, meter maintenance & , causes & treatment for high & low blood sugar, sick day management skills, diabetes success plan - behavior change goals set, carbohydrate counting review, strategies for food selection when eating away from home and alcohol and diabetes.     Goals:      01/04/17 1500   OTHER   Date of initial Diabetes Education visit 12/06/16   BEHAVIORAL OUTCOMES / GOALS   GOAL: Healthy Eating Not chosen   GOAL: Physical Activity (Exercise longer.  Thinks she can if back allows. )   GOAL: Monitoring Not chosen   GOAL: Medication Taking Not chosen   GOAL: Problem Solving Not chosen   GOAL: Reducing Risks (Lose weight.  She would like to lose a few more pounds. )   GOAL: Healthy Coping Not chosen   EXAMS   Dilated eye exam completed within past 12 months Yes   Date of Eye Exam 10/03/16   Foot exam completed within past 12 months No   Dental exam completed within past 12 months No   Additional Therapies   Do you smoke? No   Are you on ASA therapy? Yes       Pt is doing very well at this time - glucose levels have trended down nicely. "     Plan: Continue meal planning efforts and exercise.  Test glucose 1x/day at alternating times.      Follow up: Session 3.     Total time spent with patient: 45 minutes.      ROSEANNE Stewart, CDE       Patient w/ NIHSS of 1 on admission for decreased sensation in right arm. CT head, CTA and CT perfusion negative.   -c/w aspirin 81, atorvastatin 40  -f/u Lipid panel, Tsh, HgbA1c  -PT/OT  -Neuro checks per protocol  - f/u DARYL echo and MRI Patient w/ NIHSS of 1 on admission for decreased sensation in right arm. CT head, CTA and CT perfusion negative. Follow up MR head and MR spine for possible cervical radiculopathy.  -c/w aspirin 81, atorvastatin 40  -f/u Lipid panel, Tsh, HgbA1c  -PT/OT  -Neuro checks per protocol  - f/u DARYL echo and MRI

## 2018-08-22 NOTE — OCCUPATIONAL THERAPY INITIAL EVALUATION ADULT - ADDITIONAL COMMENTS
Per patient he was independent with ADL and ambulation PTA. States he owns a straight cane but does not use it. Of note, patient states his apartment is in poor repair (problems with leaks, [place], water temperature), reports his unit is rent controlled and he has been in contact with authorities regarding tenant harassment and lack of maintenance. States he has water, hear/cooling, kitchen and bathroom facilities.

## 2018-08-23 VITALS — TEMPERATURE: 99 F

## 2018-08-23 LAB
ANION GAP SERPL CALC-SCNC: 11 MMOL/L — SIGNIFICANT CHANGE UP (ref 5–17)
BASOPHILS NFR BLD AUTO: 0.7 % — SIGNIFICANT CHANGE UP (ref 0–2)
BUN SERPL-MCNC: 14 MG/DL — SIGNIFICANT CHANGE UP (ref 7–23)
CALCIUM SERPL-MCNC: 9 MG/DL — SIGNIFICANT CHANGE UP (ref 8.4–10.5)
CHLORIDE SERPL-SCNC: 106 MMOL/L — SIGNIFICANT CHANGE UP (ref 96–108)
CO2 SERPL-SCNC: 25 MMOL/L — SIGNIFICANT CHANGE UP (ref 22–31)
CREAT SERPL-MCNC: 0.84 MG/DL — SIGNIFICANT CHANGE UP (ref 0.5–1.3)
EOSINOPHIL NFR BLD AUTO: 3.5 % — SIGNIFICANT CHANGE UP (ref 0–6)
GLUCOSE SERPL-MCNC: 123 MG/DL — HIGH (ref 70–99)
HCT VFR BLD CALC: 42.6 % — SIGNIFICANT CHANGE UP (ref 39–50)
HGB BLD-MCNC: 13.9 G/DL — SIGNIFICANT CHANGE UP (ref 13–17)
LYMPHOCYTES # BLD AUTO: 33.9 % — SIGNIFICANT CHANGE UP (ref 13–44)
MAGNESIUM SERPL-MCNC: 2.1 MG/DL — SIGNIFICANT CHANGE UP (ref 1.6–2.6)
MCHC RBC-ENTMCNC: 32.3 PG — SIGNIFICANT CHANGE UP (ref 27–34)
MCHC RBC-ENTMCNC: 32.6 G/DL — SIGNIFICANT CHANGE UP (ref 32–36)
MCV RBC AUTO: 99.1 FL — SIGNIFICANT CHANGE UP (ref 80–100)
MONOCYTES NFR BLD AUTO: 6.2 % — SIGNIFICANT CHANGE UP (ref 2–14)
NEUTROPHILS NFR BLD AUTO: 55.7 % — SIGNIFICANT CHANGE UP (ref 43–77)
PLATELET # BLD AUTO: 220 K/UL — SIGNIFICANT CHANGE UP (ref 150–400)
POTASSIUM SERPL-MCNC: 4.2 MMOL/L — SIGNIFICANT CHANGE UP (ref 3.5–5.3)
POTASSIUM SERPL-SCNC: 4.2 MMOL/L — SIGNIFICANT CHANGE UP (ref 3.5–5.3)
RBC # BLD: 4.3 M/UL — SIGNIFICANT CHANGE UP (ref 4.2–5.8)
RBC # FLD: 13.4 % — SIGNIFICANT CHANGE UP (ref 10.3–16.9)
SODIUM SERPL-SCNC: 142 MMOL/L — SIGNIFICANT CHANGE UP (ref 135–145)
WBC # BLD: 7.1 K/UL — SIGNIFICANT CHANGE UP (ref 3.8–10.5)
WBC # FLD AUTO: 7.1 K/UL — SIGNIFICANT CHANGE UP (ref 3.8–10.5)

## 2018-08-23 PROCEDURE — 80307 DRUG TEST PRSMV CHEM ANLYZR: CPT

## 2018-08-23 PROCEDURE — 82962 GLUCOSE BLOOD TEST: CPT

## 2018-08-23 PROCEDURE — 96372 THER/PROPH/DIAG INJ SC/IM: CPT

## 2018-08-23 PROCEDURE — 93005 ELECTROCARDIOGRAM TRACING: CPT

## 2018-08-23 PROCEDURE — 85025 COMPLETE CBC W/AUTO DIFF WBC: CPT

## 2018-08-23 PROCEDURE — 99238 HOSP IP/OBS DSCHRG MGMT 30/<: CPT

## 2018-08-23 PROCEDURE — 84443 ASSAY THYROID STIM HORMONE: CPT

## 2018-08-23 PROCEDURE — 86850 RBC ANTIBODY SCREEN: CPT

## 2018-08-23 PROCEDURE — 70498 CT ANGIOGRAPHY NECK: CPT

## 2018-08-23 PROCEDURE — 84484 ASSAY OF TROPONIN QUANT: CPT

## 2018-08-23 PROCEDURE — 86901 BLOOD TYPING SEROLOGIC RH(D): CPT

## 2018-08-23 PROCEDURE — 86900 BLOOD TYPING SEROLOGIC ABO: CPT

## 2018-08-23 PROCEDURE — 70551 MRI BRAIN STEM W/O DYE: CPT

## 2018-08-23 PROCEDURE — 0042T: CPT

## 2018-08-23 PROCEDURE — 80053 COMPREHEN METABOLIC PANEL: CPT

## 2018-08-23 PROCEDURE — 70496 CT ANGIOGRAPHY HEAD: CPT

## 2018-08-23 PROCEDURE — 36415 COLL VENOUS BLD VENIPUNCTURE: CPT

## 2018-08-23 PROCEDURE — 81003 URINALYSIS AUTO W/O SCOPE: CPT

## 2018-08-23 PROCEDURE — 83036 HEMOGLOBIN GLYCOSYLATED A1C: CPT

## 2018-08-23 PROCEDURE — 80048 BASIC METABOLIC PNL TOTAL CA: CPT

## 2018-08-23 PROCEDURE — 99285 EMERGENCY DEPT VISIT HI MDM: CPT | Mod: 25

## 2018-08-23 PROCEDURE — 80061 LIPID PANEL: CPT

## 2018-08-23 PROCEDURE — 85610 PROTHROMBIN TIME: CPT

## 2018-08-23 PROCEDURE — 85730 THROMBOPLASTIN TIME PARTIAL: CPT

## 2018-08-23 PROCEDURE — 72141 MRI NECK SPINE W/O DYE: CPT

## 2018-08-23 PROCEDURE — 70450 CT HEAD/BRAIN W/O DYE: CPT

## 2018-08-23 PROCEDURE — 83735 ASSAY OF MAGNESIUM: CPT

## 2018-08-23 PROCEDURE — 97161 PT EVAL LOW COMPLEX 20 MIN: CPT

## 2018-08-23 RX ORDER — CYCLOBENZAPRINE HYDROCHLORIDE 10 MG/1
1 TABLET, FILM COATED ORAL
Qty: 0 | Refills: 0 | COMMUNITY
Start: 2018-08-23

## 2018-08-23 RX ORDER — DEXTROAMPHETAMINE SACCHARATE, AMPHETAMINE ASPARTATE, DEXTROAMPHETAMINE SULFATE AND AMPHETAMINE SULFATE 1.875; 1.875; 1.875; 1.875 MG/1; MG/1; MG/1; MG/1
10 TABLET ORAL
Qty: 0 | Refills: 0 | COMMUNITY

## 2018-08-23 RX ORDER — RISPERIDONE 4 MG/1
1 TABLET ORAL
Qty: 0 | Refills: 0 | COMMUNITY

## 2018-08-23 RX ORDER — DEXTROAMPHETAMINE SACCHARATE, AMPHETAMINE ASPARTATE, DEXTROAMPHETAMINE SULFATE AND AMPHETAMINE SULFATE 1.875; 1.875; 1.875; 1.875 MG/1; MG/1; MG/1; MG/1
1 TABLET ORAL
Qty: 0 | Refills: 0 | COMMUNITY
Start: 2018-08-23

## 2018-08-23 RX ORDER — VENLAFAXINE HCL 75 MG
1 CAPSULE, EXT RELEASE 24 HR ORAL
Qty: 0 | Refills: 0 | COMMUNITY
Start: 2018-08-23

## 2018-08-23 RX ORDER — METRONIDAZOLE 500 MG
1 TABLET ORAL
Qty: 0 | Refills: 0 | COMMUNITY

## 2018-08-23 RX ORDER — RISPERIDONE 4 MG/1
1 TABLET ORAL
Qty: 0 | Refills: 0 | COMMUNITY
Start: 2018-08-23

## 2018-08-23 RX ORDER — CARISOPRODOL 250 MG
1 TABLET ORAL
Qty: 0 | Refills: 0 | COMMUNITY

## 2018-08-23 RX ORDER — CARISOPRODOL 250 MG
1 TABLET ORAL
Qty: 0 | Refills: 0 | COMMUNITY
Start: 2018-08-23

## 2018-08-23 RX ORDER — CIPROFLOXACIN LACTATE 400MG/40ML
1 VIAL (ML) INTRAVENOUS
Qty: 0 | Refills: 0 | COMMUNITY

## 2018-08-23 RX ORDER — VENLAFAXINE HCL 75 MG
1 CAPSULE, EXT RELEASE 24 HR ORAL
Qty: 0 | Refills: 0 | COMMUNITY

## 2018-08-23 RX ADMIN — Medication 200 MILLIGRAM(S): at 05:42

## 2018-08-23 RX ADMIN — Medication 1 PATCH: at 07:07

## 2018-08-23 RX ADMIN — RISPERIDONE 0.5 MILLIGRAM(S): 4 TABLET ORAL at 17:16

## 2018-08-23 RX ADMIN — RISPERIDONE 0.5 MILLIGRAM(S): 4 TABLET ORAL at 11:13

## 2018-08-23 RX ADMIN — Medication 200 MILLIGRAM(S): at 13:27

## 2018-08-23 RX ADMIN — DEXTROAMPHETAMINE SACCHARATE, AMPHETAMINE ASPARTATE, DEXTROAMPHETAMINE SULFATE AND AMPHETAMINE SULFATE 20 MILLIGRAM(S): 1.875; 1.875; 1.875; 1.875 TABLET ORAL at 05:42

## 2018-08-23 RX ADMIN — Medication 75 MILLIGRAM(S): at 17:16

## 2018-08-23 RX ADMIN — OXYCODONE AND ACETAMINOPHEN 1 TABLET(S): 5; 325 TABLET ORAL at 10:00

## 2018-08-23 RX ADMIN — DEXTROAMPHETAMINE SACCHARATE, AMPHETAMINE ASPARTATE, DEXTROAMPHETAMINE SULFATE AND AMPHETAMINE SULFATE 20 MILLIGRAM(S): 1.875; 1.875; 1.875; 1.875 TABLET ORAL at 17:15

## 2018-08-23 RX ADMIN — Medication 75 MILLIGRAM(S): at 09:29

## 2018-08-23 RX ADMIN — DEXTROAMPHETAMINE SACCHARATE, AMPHETAMINE ASPARTATE, DEXTROAMPHETAMINE SULFATE AND AMPHETAMINE SULFATE 20 MILLIGRAM(S): 1.875; 1.875; 1.875; 1.875 TABLET ORAL at 11:13

## 2018-08-23 RX ADMIN — HEPARIN SODIUM 5000 UNIT(S): 5000 INJECTION INTRAVENOUS; SUBCUTANEOUS at 05:42

## 2018-08-23 RX ADMIN — RISPERIDONE 0.5 MILLIGRAM(S): 4 TABLET ORAL at 05:42

## 2018-08-23 RX ADMIN — HEPARIN SODIUM 5000 UNIT(S): 5000 INJECTION INTRAVENOUS; SUBCUTANEOUS at 13:27

## 2018-08-23 RX ADMIN — OXYCODONE AND ACETAMINOPHEN 1 TABLET(S): 5; 325 TABLET ORAL at 09:34

## 2018-08-23 RX ADMIN — Medication 81 MILLIGRAM(S): at 11:13

## 2018-08-23 RX ADMIN — Medication 1 PATCH: at 07:05

## 2018-08-23 NOTE — DISCHARGE NOTE ADULT - CARE PLAN
Principal Discharge DX:	Cervical radiculopathy  Assessment and plan of treatment:	You were admitted for decreased right arm sensation which was concerning for a stroke. CT head, CTA and CT perfusion negative for concerns for stroke. MRI head without contrast confirmed no stroke. However, MRI of cervical spine showed note, MRI head w/o contrast showed no stroke but MR cervical spine showed C4/C5 spinal cord with severe foraminal narrowing, C5/C6 diffuse disc bulge and central protrusion with moderate to severe foraminal narrowing. C5/C6 moderate spinal cord compression, C6/C7  diffuse disc bulge and moderate central protrusion with moderate to severe foraminal narrowing, C6/C7 mild  spinal cord compression, which highly correlates with your physical exam of decreased right arm sensation. [facility] orthopedics evaluated you and believe you should follow up with Dr. Blackburn at [facility] for further management of your cervical radicular symptoms. However, if you continue to have headache, blurriness, slurred speech, further weakness, chest pain, shortness of breath, please see the nearest Emergency Room. Principal Discharge DX:	Cervical radiculopathy  Goal:	Follow up with your Dr. Blackburn in 1 week  Assessment and plan of treatment:	You were admitted for decreased right arm sensation which was concerning for a stroke. CT head, CTA and CT perfusion negative for concerns for stroke. MRI head without contrast confirmed no stroke. However, MRI of cervical spine showed note, MRI head w/o contrast showed no stroke but MR cervical spine showed C4/C5 spinal cord with severe foraminal narrowing, C5/C6 diffuse disc bulge and central protrusion with moderate to severe foraminal narrowing. C5/C6 moderate spinal cord compression, C6/C7  diffuse disc bulge and moderate central protrusion with moderate to severe foraminal narrowing, C6/C7 mild  spinal cord compression, which highly correlates with your physical exam of decreased right arm sensation. [facility] orthopedics evaluated you and believe you should follow up with Dr. Blackburn at [facility] for further management of your cervical radicular symptoms. However, if you continue to have headache, blurriness, slurred speech, further weakness, chest pain, shortness of breath, please see the nearest Emergency Room.

## 2018-08-23 NOTE — DISCHARGE NOTE ADULT - MEDICATION SUMMARY - MEDICATIONS TO TAKE
I will START or STAY ON the medications listed below when I get home from the hospital:  None I will START or STAY ON the medications listed below when I get home from the hospital:    pregabalin 200 mg oral capsule  -- 1 cap(s) by mouth 3 times a day  -- Indication: For Pain    venlafaxine 75 mg oral tablet  -- 1 tab(s) by mouth 2 times a day  -- Indication: For Pain    RisperDAL 0.5 mg oral tablet  -- 1 tab(s) by mouth 2 times a day  -- Indication: For Pain    Adderall 20 mg oral tablet  -- 1 tab(s) by mouth   -- Indication: For As needed    cyclobenzaprine 5 mg oral tablet  -- 1 tab(s) by mouth 3 times a day, As needed, Muscle Spasm  -- Indication: For Pain    Soma 350 mg oral tablet  -- 1 tab(s) by mouth 4 times a day  -- Indication: For Pain

## 2018-08-23 NOTE — CONSULT NOTE ADULT - SUBJECTIVE AND OBJECTIVE BOX
62M with hx of tobacco abuse, TBI 30 years ago, spinal stenosis admitted for rule out CVA due to acute onset right arm numbness and weakness. Pt woke up Tuesday with right arm numbness and weakness that was in addition to his baseline neck pain. Pt was admitted to neurology for CVA work up which at this time has been negative. Pt reports his symptoms of numbness and weakness are improving now but still has some paresthesias. At baseline pt reports he has diminished sensation to entire right arm compared to left. Pt is LHD and reports TBI 30 years ago left him with residual right skull diminished sensation that has not changed since injury. Pt sees neuro/spine MD Dr. Blackburn at [facility] for his neck and lower back pain, and had his most recent visit 1 month ago. He is planning possible epidural steroid injections for treatment.     Gen: Comfortable in chair, having lunch, pleasant, A&Ox3  MSK: cervical spine ROM intact flex/ext without discomfort. No TTP to vertebral bodies or paraspinal muscles. AIN/PIN/U nerves intact and equally strong bilaterally. Wrist flex/ext intact. Sensation intact to M/R/U/Msk/Ax nerves BUE but diminished on entire RUE compared to LUE per patient. Paresthesias at C7 distribution of forearm. Cap refill brisk. Radial pulses palpable.     Cervical spine MRI shows C4-C5 with ventral spinal cord flattening from osteophyte and severe foraminal narrowing; C5-C6 and C6-C7 central disc protrusion and moderate to severe foraminal narrowing and spinal cord compression    A/P 62M with known spinal stenosis admitted for R/O CVA in setting of acute right arm numbness and weakness  - Will review MRI with attending Dr. Valenzuela to rule out any acute changes in baseline stenosis  - Will follow up   - If no acute orthopedic intervention warranted, pt can follow up as outpatient with his outside MD   [phone]

## 2018-08-23 NOTE — DISCHARGE NOTE ADULT - HOSPITAL COURSE
63 y/o M current smoker, s/p TBI greater than 30 years ago on chronic pain medications, depression, anxiety presented with new onset right arm numbness that first began on the morning of 11am on after waking up 8/21. Of note, patient reported chronic right headed numbness at baseline secondary to his TBI but the right arm numbness is a new complaint. Given the persistence of his symptoms he went to an urgent care center in which he was advised patient to come to the hospital. Of note patient reports that he was recently given a course of abx--Ciprofloxacin and Flagyl for which he has been taking. Does not recall why he was given abx. NHISS 1 on admission. In the ED vital signs T: 98.3,  --> 91, /85, RR 18, saturating 96% on room air. On ROS patient complaining of right arm numbness, any recent injury, no recent travel, fevers, chills, chest pain, shortness of breath, abdominal pain, nausea, vomiting, fever/chills.   Pt was admitted into [facility] for work up for stroke. CT head, CTA and CT perfusion negative for any acute infarct or hemorrhage. Patient was given aspirin and atorvastatin as per stroke protocol. Of note, MRI head w/o contrast showed no stroke but MR cervical spine showed C4/C5 spinal cord with severe foraminal narrowing, C5/C6 diffuse disc bulge and central protrusion with moderate to severe foraminal narrowing. C5/C6 moderate spinal cord compression, C6/C7  diffuse disc bulge and moderate central protrusion with moderate to severe foraminal narrowing, C6/C7 mild  spinal cord compression. DARYL was cancelled as stroke was highly unlikely given the foraminal narrowing and spinal compressions found on MRI imaging and physical exam consistent with cervical dermatomal sensory distribution. Patient follows with Dr. Blackburn at [facility] for his neck and lower back pain, and saw doctor 1 month ago in which patient was planned for possible epidural steroid injection treatments. Orthopedics evaluated patient and relevant imaging of patient and he was deemed ____ for discharge 63 y/o M current smoker, s/p TBI greater than 30 years ago on chronic pain medications, depression, anxiety presented with new onset right arm numbness that first began on the morning of 11am on after waking up 8/21. Of note, patient reported chronic right headed numbness at baseline secondary to his TBI but the right arm numbness is a new complaint. Given the persistence of his symptoms he went to an urgent care center in which he was advised patient to come to the hospital. Of note patient reports that he was recently given a course of abx--Ciprofloxacin and Flagyl for which he has been taking. Does not recall why he was given abx. NHISS 1 on admission. In the ED vital signs T: 98.3,  --> 91, /85, RR 18, saturating 96% on room air. On ROS patient complaining of right arm numbness, any recent injury, no recent travel, fevers, chills, chest pain, shortness of breath, abdominal pain, nausea, vomiting, fever/chills.   Pt was admitted into [facility] for work up for stroke. CT head, CTA and CT perfusion negative for any acute infarct or hemorrhage. Patient was given aspirin and atorvastatin as per stroke protocol. Of note, MRI head w/o contrast showed no stroke but MR cervical spine showed C4/C5 spinal cord with severe foraminal narrowing, C5/C6 diffuse disc bulge and central protrusion with moderate to severe foraminal narrowing. C5/C6 moderate spinal cord compression, C6/C7  diffuse disc bulge and moderate central protrusion with moderate to severe foraminal narrowing, C6/C7 mild  spinal cord compression. DARYL was cancelled as stroke was highly unlikely given the foraminal narrowing and spinal compressions found on MRI imaging and physical exam consistent with cervical dermatomal sensory distribution. Patient follows with Dr. Blackburn at [facility] for his neck and lower back pain, and saw doctor 1 month ago in which patient was planned for possible epidural steroid injection treatments. Orthopedics evaluated patient and relevant imaging of patient and he was deemed fit for discharge 63 y/o M current smoker, s/p TBI greater than 30 years ago on chronic pain medications, depression, anxiety presented with new onset right arm numbness that first began on the morning of 11am on after waking up 8/21. Of note, patient reported chronic right headed numbness at baseline secondary to his TBI but the right arm numbness is a new complaint. Given the persistence of his symptoms he went to an urgent care center in which he was advised patient to come to the hospital. Of note patient reports that he was recently given a course of abx--Ciprofloxacin and Flagyl for which he has been taking. Does not recall why he was given abx. NHISS 1 on admission. In the ED vital signs T: 98.3,  --> 91, /85, RR 18, saturating 96% on room air. On ROS patient complaining of right arm numbness, any recent injury, no recent travel, fevers, chills, chest pain, shortness of breath, abdominal pain, nausea, vomiting, fever/chills.     Pt was admitted into [facility] for work up for stroke. CT head, CTA and CT perfusion negative for any acute infarct or hemorrhage. EE was cancelled as stroke was highly unlikely.  Patient was given aspirin and atorvastatin as per stroke protocol. Of note, MRI head w/o contrast showed no stroke so not need for new medications.      MR cervical spine showed C4/C5 spinal cord with severe foraminal narrowing, C5/C6 diffuse disc bulge and central protrusion with moderate to severe foraminal narrowing. C5/C6 moderate spinal cord compression, C6/C7  diffuse disc bulge and moderate central protrusion with moderate to severe foraminal narrowing, C6/C7 mild  spinal cord compression. Given the foraminal narrowing and spinal compressions found on MRI imaging and physical exam consistent with cervical dermatomal sensory distribution, orthopedics consulted. Orthopedics evaluated patient and relevant imaging of patient and he was deemed fit for discharge with follow up as outpatient for possible intervention.    Patient follows with Dr. Blackburn at [facility] for his neck and lower back pain, and saw doctor 1 month ago in which patient was planned for possible epidural steroid injection treatments.

## 2018-08-23 NOTE — DISCHARGE NOTE ADULT - PLAN OF CARE
You were admitted for decreased right arm sensation which was concerning for a stroke. CT head, CTA and CT perfusion negative for concerns for stroke. MRI head without contrast confirmed no stroke. However, MRI of cervical spine showed note, MRI head w/o contrast showed no stroke but MR cervical spine showed C4/C5 spinal cord with severe foraminal narrowing, C5/C6 diffuse disc bulge and central protrusion with moderate to severe foraminal narrowing. C5/C6 moderate spinal cord compression, C6/C7  diffuse disc bulge and moderate central protrusion with moderate to severe foraminal narrowing, C6/C7 mild  spinal cord compression, which highly correlates with your physical exam of decreased right arm sensation. [facility] orthopedics evaluated you and believe you should follow up with Dr. Blackburn at [facility] for further management of your cervical radicular symptoms. However, if you continue to have headache, blurriness, slurred speech, further weakness, chest pain, shortness of breath, please see the nearest Emergency Room. Follow up with your Dr. Blackburn in 1 week

## 2018-08-23 NOTE — DISCHARGE NOTE ADULT - CARE PROVIDER_API CALL
Dr. Blackburn,   [address] [facility] [address] [place] [address], [place] [address]     b/t [address] [place] [address] & [facility]  Phone: [phone]  Fax: (       - Dr. Blackburn,   [address] [facility] [address] [place] [address]     b/t [facility] & [facility]  Phone: [phone]  Fax: (   )    -    Del Valenzuela), Orthopaedic Surgery  [address] [place] [address]  Phone: [phone]  Fax: [phone]

## 2018-08-23 NOTE — DISCHARGE NOTE ADULT - MEDICATION SUMMARY - MEDICATIONS TO STOP TAKING
I will STOP taking the medications listed below when I get home from the hospital:  None I will STOP taking the medications listed below when I get home from the hospital:    ciprofloxacin 750 mg oral tablet  -- 1 tab(s) by mouth every 12 hours    metroNIDAZOLE 500 mg oral tablet  -- 1 tab(s) by mouth 3 times a day

## 2018-08-23 NOTE — CHART NOTE - NSCHARTNOTEFT_GEN_A_CORE
Ortho saw the patient and thought he would benefit from surgical intervention. Patient refused at this time. Recommended following up as an outpatient and acquiring XR prior to leaving. XR were ordered. When transport present patient stated he did not want the XR and wanted to go home. It was explained that there are risks in signing out AMA and we would advise getting the XR prior to discharge. He refused. At this time the patient signed out AMA. Information was given for him to follow up with them as an outpatient.

## 2018-08-23 NOTE — DISCHARGE NOTE ADULT - PATIENT PORTAL LINK FT
You can access the [facility] Patient Portal, offered by [facility], by registering with the following website: [email or web address]

## 2018-08-23 NOTE — DISCHARGE NOTE ADULT - PROVIDER TOKENS
FREE:[LAST:[Dr. Blackburn],PHONE:[phone]],FAX:[(   )    -],ADDRESS:[[address] [place] [address]     b/t [address] [place] [address] & [facility]]] FREE:[LAST:[Dr. Blackburn],PHONE:[phone]],FAX:[(   )    -],ADDRESS:[[address] [place] [address]     b/t [address] [place] [address] & [facility]]],TOKEN:'[id number]'

## 2018-08-23 NOTE — DISCHARGE NOTE ADULT - ADDITIONAL INSTRUCTIONS
Follow up with [place] Blackburn at [facility] for further management of your decreased sensation of your right arm and hand.

## 2018-08-28 DIAGNOSIS — F32.9 MAJOR DEPRESSIVE DISORDER, SINGLE EPISODE, UNSPECIFIED: ICD-10-CM

## 2018-08-28 DIAGNOSIS — M54.2 CERVICALGIA: ICD-10-CM

## 2018-08-28 DIAGNOSIS — Z72.0 TOBACCO USE: ICD-10-CM

## 2018-08-28 DIAGNOSIS — F41.9 ANXIETY DISORDER, UNSPECIFIED: ICD-10-CM

## 2018-08-28 DIAGNOSIS — I63.9 CEREBRAL INFARCTION, UNSPECIFIED: ICD-10-CM

## 2018-08-28 DIAGNOSIS — Z87.820 PERSONAL HISTORY OF TRAUMATIC BRAIN INJURY: ICD-10-CM

## 2018-08-28 DIAGNOSIS — F17.210 NICOTINE DEPENDENCE, CIGARETTES, UNCOMPLICATED: ICD-10-CM

## 2018-08-28 DIAGNOSIS — M50.121 CERVICAL DISC DISORDER AT C4-C5 LEVEL WITH RADICULOPATHY: ICD-10-CM

## 2018-08-28 DIAGNOSIS — G89.29 OTHER CHRONIC PAIN: ICD-10-CM

## 2018-08-28 DIAGNOSIS — M48.00 SPINAL STENOSIS, SITE UNSPECIFIED: ICD-10-CM

## 2019-02-28 NOTE — OCCUPATIONAL THERAPY INITIAL EVALUATION ADULT - UPPER BODY DRESSING, PREVIOUS LEVEL OF FUNCTION, OT EVAL
Signed Prescriptions:                        Disp   Refills    gabapentin (NEURONTIN) 300 MG capsule      180 ca*1        Sig: Take 2 capsules (600 mg) by mouth 3 times daily  Authorizing Provider: ROXANA ROSE MD  [facility]   independent

## 2020-08-25 NOTE — CONSULT NOTE ADULT - ATTENDING COMMENTS
Patient seen and examined.  Agree with above.  Unclear if right sided numbness is new or related to previous TBI so admitted for workup.  Based on exam, seems more peripheral given its distribution.  May be radial since decreased along lateral portion of his right arm.  Therefore, will order MRI Brain and Cervical spine and then adjust medications accordingly. 2

## 2024-10-09 PROBLEM — Z00.00 ENCOUNTER FOR PREVENTIVE HEALTH EXAMINATION: Status: ACTIVE | Noted: 2024-10-09

## 2024-10-22 ENCOUNTER — APPOINTMENT (OUTPATIENT)
Dept: WOUND CARE | Facility: CLINIC | Age: 69
End: 2024-10-22

## 2024-10-22 ENCOUNTER — OUTPATIENT (OUTPATIENT)
Dept: OUTPATIENT SERVICES | Facility: HOSPITAL | Age: 69
LOS: 1 days | End: 2024-10-22
Payer: MEDICARE

## 2024-10-22 VITALS
DIASTOLIC BLOOD PRESSURE: 84 MMHG | OXYGEN SATURATION: 98 % | TEMPERATURE: 97.3 F | SYSTOLIC BLOOD PRESSURE: 130 MMHG | RESPIRATION RATE: 18 BRPM | HEART RATE: 82 BPM | BODY MASS INDEX: 21.22 KG/M2 | HEIGHT: 68 IN | WEIGHT: 140 LBS

## 2024-10-22 DIAGNOSIS — Z00.00 ENCOUNTER FOR GENERAL ADULT MEDICAL EXAMINATION WITHOUT ABNORMAL FINDINGS: ICD-10-CM

## 2024-10-22 DIAGNOSIS — L97.519 NON-PRESSURE CHRONIC ULCER OF OTHER PART OF RIGHT FOOT WITH UNSPECIFIED SEVERITY: ICD-10-CM

## 2024-10-22 DIAGNOSIS — I73.9 PERIPHERAL VASCULAR DISEASE, UNSPECIFIED: ICD-10-CM

## 2024-10-22 DIAGNOSIS — Z95.0 PRESENCE OF CARDIAC PACEMAKER: ICD-10-CM

## 2024-10-22 DIAGNOSIS — F20.9 SCHIZOPHRENIA, UNSPECIFIED: ICD-10-CM

## 2024-10-22 DIAGNOSIS — G47.00 INSOMNIA, UNSPECIFIED: ICD-10-CM

## 2024-10-22 DIAGNOSIS — F90.9 ATTENTION-DEFICIT HYPERACTIVITY DISORDER, UNSPECIFIED TYPE: ICD-10-CM

## 2024-10-22 DIAGNOSIS — J44.9 CHRONIC OBSTRUCTIVE PULMONARY DISEASE, UNSPECIFIED: ICD-10-CM

## 2024-10-22 DIAGNOSIS — R91.1 SOLITARY PULMONARY NODULE: ICD-10-CM

## 2024-10-22 DIAGNOSIS — E78.5 HYPERLIPIDEMIA, UNSPECIFIED: ICD-10-CM

## 2024-10-22 DIAGNOSIS — L85.3 XEROSIS CUTIS: ICD-10-CM

## 2024-10-22 DIAGNOSIS — H54.7 UNSPECIFIED VISUAL LOSS: ICD-10-CM

## 2024-10-22 DIAGNOSIS — K59.00 CONSTIPATION, UNSPECIFIED: ICD-10-CM

## 2024-10-22 DIAGNOSIS — F90.1 ATTENTION-DEFICIT HYPERACTIVITY DISORDER, PREDOMINANTLY HYPERACTIVE TYPE: ICD-10-CM

## 2024-10-22 PROBLEM — M48.00 SPINAL STENOSIS, SITE UNSPECIFIED: Chronic | Status: ACTIVE | Noted: 2018-08-21

## 2024-10-22 PROBLEM — S06.9X9A UNSPECIFIED INTRACRANIAL INJURY WITH LOSS OF CONSCIOUSNESS OF UNSPECIFIED DURATION, INITIAL ENCOUNTER: Chronic | Status: ACTIVE | Noted: 2018-08-21

## 2024-10-22 RX ORDER — FLUVOXAMINE MALEATE 50 MG/1
50 TABLET ORAL
Refills: 0 | Status: ACTIVE | COMMUNITY

## 2024-10-22 RX ORDER — CLOPIDOGREL 75 MG/1
75 TABLET, FILM COATED ORAL
Refills: 0 | Status: ACTIVE | COMMUNITY

## 2024-10-22 RX ORDER — FAMOTIDINE 40 MG/1
40 TABLET, FILM COATED ORAL
Refills: 0 | Status: ACTIVE | COMMUNITY

## 2024-10-22 RX ORDER — COLLAGENASE SANTYL 250 [ARB'U]/G
250 OINTMENT TOPICAL DAILY
Qty: 1 | Refills: 0 | Status: ACTIVE | COMMUNITY
Start: 2024-10-22 | End: 1900-01-01

## 2024-10-22 RX ORDER — ATORVASTATIN CALCIUM 80 MG/1
80 TABLET, FILM COATED ORAL
Refills: 0 | Status: ACTIVE | COMMUNITY

## 2024-10-22 RX ORDER — METOPROLOL TARTRATE 25 MG/1
25 TABLET ORAL
Refills: 0 | Status: ACTIVE | COMMUNITY

## 2024-10-22 RX ORDER — PREGABALIN 200 MG/1
200 CAPSULE ORAL
Refills: 0 | Status: ACTIVE | COMMUNITY

## 2024-10-22 RX ORDER — QUETIAPINE 25 MG/1
25 TABLET, FILM COATED ORAL
Refills: 0 | Status: ACTIVE | COMMUNITY

## 2024-10-22 RX ORDER — BISACODYL 5 MG/1
5 TABLET, DELAYED RELEASE ORAL
Refills: 0 | Status: ACTIVE | COMMUNITY

## 2024-10-22 RX ORDER — DEXTROAMPHETAMINE SULFATE 30 MG/1
TABLET ORAL
Refills: 0 | Status: ACTIVE | COMMUNITY

## 2024-10-22 RX ORDER — ONDANSETRON HYDROCHLORIDE 4 MG/1
4 TABLET, FILM COATED ORAL
Refills: 0 | Status: ACTIVE | COMMUNITY

## 2024-10-22 RX ORDER — MECLIZINE HCL 50 MG/1
TABLET ORAL
Refills: 0 | Status: ACTIVE | COMMUNITY

## 2024-10-22 RX ORDER — CHLORHEXIDINE GLUCONATE 4 %
LIQUID (ML) TOPICAL
Refills: 0 | Status: ACTIVE | COMMUNITY

## 2024-10-22 RX ORDER — AMMONIUM LACTATE 70 %
SOLUTION, NON-ORAL MISCELLANEOUS
Refills: 0 | Status: ACTIVE | COMMUNITY

## 2024-10-22 RX ORDER — METFORMIN HYDROCHLORIDE 500 MG/1
500 TABLET, COATED ORAL
Refills: 0 | Status: ACTIVE | COMMUNITY

## 2024-10-22 RX ORDER — ALBUTEROL 90 MCG
AEROSOL (GRAM) INHALATION
Refills: 0 | Status: ACTIVE | COMMUNITY

## 2024-10-22 RX ORDER — MUPIROCIN 20 MG/G
OINTMENT TOPICAL
Refills: 0 | Status: ACTIVE | COMMUNITY

## 2024-10-24 DIAGNOSIS — I73.9 PERIPHERAL VASCULAR DISEASE, UNSPECIFIED: ICD-10-CM

## 2024-10-24 DIAGNOSIS — E11.69 TYPE 2 DIABETES MELLITUS WITH OTHER SPECIFIED COMPLICATION: ICD-10-CM

## 2024-10-24 DIAGNOSIS — I70.235 ATHEROSCLEROSIS OF NATIVE ARTERIES OF RIGHT LEG WITH ULCERATION OF OTHER PART OF FOOT: ICD-10-CM

## 2024-10-29 ENCOUNTER — APPOINTMENT (OUTPATIENT)
Dept: WOUND CARE | Facility: CLINIC | Age: 69
End: 2024-10-29

## 2024-11-20 PROCEDURE — G0463: CPT
